# Patient Record
Sex: FEMALE | Race: WHITE | NOT HISPANIC OR LATINO | URBAN - METROPOLITAN AREA
[De-identification: names, ages, dates, MRNs, and addresses within clinical notes are randomized per-mention and may not be internally consistent; named-entity substitution may affect disease eponyms.]

---

## 2022-12-12 ENCOUNTER — APPOINTMENT (OUTPATIENT)
Dept: URBAN - METROPOLITAN AREA CLINIC 204 | Age: 62
Setting detail: DERMATOLOGY
End: 2022-12-12

## 2022-12-12 DIAGNOSIS — L91.8 OTHER HYPERTROPHIC DISORDERS OF THE SKIN: ICD-10-CM

## 2022-12-12 DIAGNOSIS — L85.3 XEROSIS CUTIS: ICD-10-CM

## 2022-12-12 DIAGNOSIS — R23.3 SPONTANEOUS ECCHYMOSES: ICD-10-CM

## 2022-12-12 PROBLEM — D23.71 OTHER BENIGN NEOPLASM OF SKIN OF RIGHT LOWER LIMB, INCLUDING HIP: Status: ACTIVE | Noted: 2022-12-12

## 2022-12-12 PROBLEM — D23.72 OTHER BENIGN NEOPLASM OF SKIN OF LEFT LOWER LIMB, INCLUDING HIP: Status: ACTIVE | Noted: 2022-12-12

## 2022-12-12 PROCEDURE — 99202 OFFICE O/P NEW SF 15 MIN: CPT

## 2022-12-12 PROCEDURE — OTHER MIPS QUALITY: OTHER

## 2022-12-12 PROCEDURE — OTHER ADDITIONAL NOTES: OTHER

## 2022-12-12 PROCEDURE — OTHER COUNSELING: OTHER

## 2022-12-12 ASSESSMENT — LOCATION DETAILED DESCRIPTION DERM
LOCATION DETAILED: RIGHT SUPERIOR ANTERIOR NECK
LOCATION DETAILED: RIGHT INFERIOR LATERAL NECK
LOCATION DETAILED: RIGHT CLAVICULAR NECK
LOCATION DETAILED: RIGHT INFERIOR CENTRAL MALAR CHEEK

## 2022-12-12 ASSESSMENT — LOCATION SIMPLE DESCRIPTION DERM
LOCATION SIMPLE: RIGHT CHEEK
LOCATION SIMPLE: RIGHT ANTERIOR NECK

## 2022-12-12 ASSESSMENT — LOCATION ZONE DERM
LOCATION ZONE: NECK
LOCATION ZONE: FACE

## 2022-12-12 NOTE — PROCEDURE: MIPS QUALITY
Quality 111:Pneumonia Vaccination Status For Older Adults: Pneumococcal vaccine (PPSV23) administered on or after patient’s 60th birthday and before the end of the measurement period
Detail Level: Detailed
Quality 226: Preventive Care And Screening: Tobacco Use: Screening And Cessation Intervention: Patient screened for tobacco use and is an ex/non-smoker
Quality 130: Documentation Of Current Medications In The Medical Record: Current Medications Documented
Quality 110: Preventive Care And Screening: Influenza Immunization: Influenza Immunization previously received during influenza season

## 2022-12-12 NOTE — PROCEDURE: ADDITIONAL NOTES
Additional Notes: 1 skin tag removed at todays visit
Detail Level: Simple
Render Risk Assessment In Note?: no
Additional Notes: Hx of recent chemical peel

## 2023-05-09 ENCOUNTER — OFFICE VISIT (OUTPATIENT)
Dept: PRIMARY CARE | Facility: CLINIC | Age: 63
End: 2023-05-09
Payer: COMMERCIAL

## 2023-05-09 VITALS
HEART RATE: 78 BPM | OXYGEN SATURATION: 98 % | WEIGHT: 182 LBS | BODY MASS INDEX: 29.25 KG/M2 | SYSTOLIC BLOOD PRESSURE: 114 MMHG | HEIGHT: 66 IN | DIASTOLIC BLOOD PRESSURE: 74 MMHG

## 2023-05-09 DIAGNOSIS — E03.9 HYPOTHYROIDISM, UNSPECIFIED TYPE: Primary | ICD-10-CM

## 2023-05-09 DIAGNOSIS — N28.9 DECREASED RENAL FUNCTION: ICD-10-CM

## 2023-05-09 DIAGNOSIS — E78.2 MIXED HYPERLIPIDEMIA: ICD-10-CM

## 2023-05-09 PROBLEM — F42.8 OBSESSIVE THINKING: Status: ACTIVE | Noted: 2023-05-09

## 2023-05-09 PROBLEM — E78.5 HYPERLIPIDEMIA: Status: ACTIVE | Noted: 2023-05-09

## 2023-05-09 PROCEDURE — 99214 OFFICE O/P EST MOD 30 MIN: CPT | Performed by: NURSE PRACTITIONER

## 2023-05-09 PROCEDURE — 3008F BODY MASS INDEX DOCD: CPT | Performed by: NURSE PRACTITIONER

## 2023-05-09 PROCEDURE — 1036F TOBACCO NON-USER: CPT | Performed by: NURSE PRACTITIONER

## 2023-05-09 RX ORDER — LEVOTHYROXINE SODIUM 150 UG/1
150 TABLET ORAL DAILY
Qty: 90 TABLET | Refills: 1 | Status: CANCELLED | OUTPATIENT
Start: 2023-05-09

## 2023-05-09 RX ORDER — LEVOTHYROXINE SODIUM 25 UG/1
25 TABLET ORAL
Qty: 36 TABLET | Refills: 1 | Status: CANCELLED | OUTPATIENT
Start: 2023-05-09

## 2023-05-09 RX ORDER — LEVOTHYROXINE SODIUM 100 UG/1
100 TABLET ORAL DAILY
Qty: 90 TABLET | Refills: 1 | Status: SHIPPED | OUTPATIENT
Start: 2023-05-09

## 2023-05-09 RX ORDER — LEVOTHYROXINE SODIUM 150 UG/1
1 TABLET ORAL DAILY
COMMUNITY
Start: 2019-11-14 | End: 2023-05-09 | Stop reason: DRUGHIGH

## 2023-05-09 RX ORDER — LEVOTHYROXINE SODIUM 25 UG/1
25 TABLET ORAL
COMMUNITY
Start: 2021-06-09 | End: 2023-05-09 | Stop reason: DRUGHIGH

## 2023-05-09 RX ORDER — PRAVASTATIN SODIUM 40 MG/1
1 TABLET ORAL DAILY
COMMUNITY
Start: 2019-11-14 | End: 2023-05-09 | Stop reason: SDUPTHER

## 2023-05-09 RX ORDER — PRAVASTATIN SODIUM 40 MG/1
40 TABLET ORAL DAILY
Qty: 90 TABLET | Refills: 1 | Status: SHIPPED | OUTPATIENT
Start: 2023-05-09

## 2023-05-09 ASSESSMENT — ENCOUNTER SYMPTOMS
VOMITING: 0
ARTHRALGIAS: 0
NAUSEA: 0
PSYCHIATRIC NEGATIVE: 1
DIARRHEA: 0
BLOOD IN STOOL: 0
SHORTNESS OF BREATH: 0
FATIGUE: 0
PALPITATIONS: 0
CONSTIPATION: 0
MYALGIAS: 0
LIGHT-HEADEDNESS: 0
HEADACHES: 0
ABDOMINAL PAIN: 0
DYSURIA: 0
COLOR CHANGE: 0
CHEST TIGHTNESS: 0
DIFFICULTY URINATING: 0
DIZZINESS: 0

## 2023-05-09 ASSESSMENT — PATIENT HEALTH QUESTIONNAIRE - PHQ9
SUM OF ALL RESPONSES TO PHQ9 QUESTIONS 1 AND 2: 0
2. FEELING DOWN, DEPRESSED OR HOPELESS: NOT AT ALL
1. LITTLE INTEREST OR PLEASURE IN DOING THINGS: NOT AT ALL

## 2023-05-09 NOTE — ASSESSMENT & PLAN NOTE
Will recheck BMP in 1 month. Increase water intake. Avoid NSAIDs. Stop Creatine supplement. She is going to stop taking Contrave (she is getting this through an online clinic/weight loss) d/t nephrotoxicity.

## 2023-05-09 NOTE — ASSESSMENT & PLAN NOTE
Will decrease Levothyroxine to 100 mcg daily due to weight loss and decreased TSH and elevated T4. Recheck TSH and T4 in 3 months.

## 2023-05-09 NOTE — PROGRESS NOTES
"Subjective   Patient ID: Shahzad Morris is a 62 y.o. female who presents wellness exam; lab and med review; med refills.      HPI  Shahzad returns for chronic care.     Hypothyroid: she is now hyperthyroid. Denies symptoms. She currently is taking Levothyroxine 150 mcg daily with an extra 25 mcg three days a week.     Hyperlipidemia: has lost 30#. Lipid panel improved. She is taking Pravastatin 40 mg, without any problems. Denies any muscle aches.     Colonoscopy due: July 2031  Mammogram due: September 2023    Review of Systems   Constitutional:  Negative for fatigue.   Respiratory:  Negative for chest tightness and shortness of breath.    Cardiovascular:  Negative for chest pain, palpitations and leg swelling.   Gastrointestinal:  Negative for abdominal pain, blood in stool, constipation, diarrhea, nausea and vomiting.   Genitourinary:  Negative for decreased urine volume, difficulty urinating and dysuria.   Musculoskeletal:  Negative for arthralgias and myalgias.   Skin:  Negative for color change.   Neurological:  Negative for dizziness, light-headedness and headaches.   Psychiatric/Behavioral: Negative.         Objective   Vitals:    05/09/23 1057   BP: 114/74   BP Location: Right arm   Pulse: 78   SpO2: 98%   Weight: 82.6 kg (182 lb)   Height: 1.676 m (5' 6\")      Physical Exam  Vitals and nursing note reviewed.   Constitutional:       Appearance: Normal appearance.   HENT:      Head: Normocephalic and atraumatic.   Cardiovascular:      Rate and Rhythm: Normal rate and regular rhythm.      Heart sounds: Normal heart sounds.   Pulmonary:      Effort: Pulmonary effort is normal.      Breath sounds: Normal breath sounds.   Abdominal:      General: Bowel sounds are normal.      Palpations: Abdomen is soft.   Skin:     General: Skin is warm and dry.   Neurological:      Mental Status: She is alert and oriented to person, place, and time.   Psychiatric:         Mood and Affect: Mood normal.         Behavior: " Behavior normal.         Thought Content: Thought content normal.         Judgment: Judgment normal.         Assessment/Plan   Diagnoses and all orders for this visit:  Hypothyroidism, unspecified type  -     levothyroxine (Synthroid, Levoxyl) 100 mcg tablet; Take 1 tablet (100 mcg) by mouth once daily.  -     TSH; Future  -     T4, free; Future  Decreased renal function  -     Basic metabolic panel; Future  Mixed hyperlipidemia  -     pravastatin (Pravachol) 40 mg tablet; Take 1 tablet (40 mg) by mouth once daily.  BMI 29.0-29.9,adult      Problem List Items Addressed This Visit       Mixed hyperlipidemia     Pravastatin 40 mg daily         Relevant Medications    pravastatin (Pravachol) 40 mg tablet    Hypothyroidism - Primary     Will decrease Levothyroxine to 100 mcg daily due to weight loss and decreased TSH and elevated T4. Recheck TSH and T4 in 3 months.          Relevant Medications    levothyroxine (Synthroid, Levoxyl) 100 mcg tablet    Other Relevant Orders    TSH    T4, free    Decreased renal function     Will recheck BMP in 1 month. Increase water intake. Avoid NSAIDs. Stop Creatine supplement. She is going to stop taking Contrave (she is getting this through an online clinic/weight loss) d/t nephrotoxicity.          Relevant Orders    Basic metabolic panel    BMI 29.0-29.9,adult     Follow up in 6 months for chronic care.

## 2023-07-25 ENCOUNTER — APPOINTMENT (OUTPATIENT)
Dept: PRIMARY CARE | Facility: CLINIC | Age: 63
End: 2023-07-25

## 2023-10-04 ENCOUNTER — NEW PATIENT (OUTPATIENT)
Dept: URBAN - METROPOLITAN AREA CLINIC 43 | Facility: CLINIC | Age: 63
End: 2023-10-04

## 2023-10-04 DIAGNOSIS — H11.32: ICD-10-CM

## 2023-10-04 PROCEDURE — 92002 INTRM OPH EXAM NEW PATIENT: CPT

## 2023-10-04 ASSESSMENT — VISUAL ACUITY
OD_CC: 20/20-1
OS_CC: 20/20

## 2023-10-25 ENCOUNTER — COMPREHENSIVE EXAM (OUTPATIENT)
Dept: URBAN - METROPOLITAN AREA CLINIC 43 | Facility: CLINIC | Age: 63
End: 2023-10-25

## 2023-10-25 DIAGNOSIS — H25.9: ICD-10-CM

## 2023-10-25 PROCEDURE — 92014 COMPRE OPH EXAM EST PT 1/>: CPT

## 2023-10-25 PROCEDURE — 92015 DETERMINE REFRACTIVE STATE: CPT

## 2023-10-25 ASSESSMENT — VISUAL ACUITY
OS_CC: J3
OS_SC: J12
OD_SC: J12
OD_CC: 20/25-2
OS_CC: 20/30+2
OS_SC: 20/60+1
OD_SC: 20/50-1
OD_CC: J2

## 2023-10-25 ASSESSMENT — TONOMETRY
OS_IOP_MMHG: 17
OD_IOP_MMHG: 16

## 2025-06-13 NOTE — HPI: EVALUATION OF SKIN LESION(S)
How Severe Are Your Spot(S)?: mild
Have Your Spot(S) Been Treated In The Past?: has not been treated
Hpi Title: Evaluation of Skin Lesions
Opt out